# Patient Record
Sex: MALE | Race: ASIAN | Employment: FULL TIME | URBAN - METROPOLITAN AREA
[De-identification: names, ages, dates, MRNs, and addresses within clinical notes are randomized per-mention and may not be internally consistent; named-entity substitution may affect disease eponyms.]

---

## 2024-08-24 ENCOUNTER — HOSPITAL ENCOUNTER (INPATIENT)
Age: 44
LOS: 1 days | Discharge: HOME | DRG: 158 | End: 2024-08-25
Attending: EMERGENCY MEDICINE | Admitting: INTERNAL MEDICINE
Payer: COMMERCIAL

## 2024-08-24 ENCOUNTER — APPOINTMENT (OUTPATIENT)
Dept: CT IMAGING | Age: 44
DRG: 158 | End: 2024-08-24
Payer: COMMERCIAL

## 2024-08-24 DIAGNOSIS — K14.0 GLOSSITIS: Primary | ICD-10-CM

## 2024-08-24 DIAGNOSIS — K14.0 ABSCESS OF TONGUE: ICD-10-CM

## 2024-08-24 PROBLEM — R22.0 TONGUE SWELLING: Status: ACTIVE | Noted: 2024-08-24

## 2024-08-24 LAB
ALBUMIN SERPL-MCNC: 4.3 G/DL (ref 3.5–5.2)
ALBUMIN/GLOB SERPL: 2 {RATIO} (ref 1–2.5)
ALP SERPL-CCNC: 63 U/L (ref 40–129)
ALT SERPL-CCNC: 34 U/L (ref 10–50)
ANION GAP SERPL CALCULATED.3IONS-SCNC: 12 MMOL/L (ref 9–16)
AST SERPL-CCNC: 28 U/L (ref 10–50)
ATYPICAL LYMPHOCYTE ABSOLUTE COUNT: 0.05 K/UL
ATYPICAL LYMPHOCYTES: 1 %
BACTERIA URNS QL MICRO: NORMAL
BASOPHILS # BLD: 0.05 K/UL (ref 0–0.2)
BASOPHILS NFR BLD: 1 % (ref 0–2)
BILIRUB DIRECT SERPL-MCNC: 0.1 MG/DL (ref 0–0.2)
BILIRUB INDIRECT SERPL-MCNC: 0.1 MG/DL (ref 0–1)
BILIRUB SERPL-MCNC: 0.2 MG/DL (ref 0–1.2)
BILIRUB UR QL STRIP: NEGATIVE
BUN SERPL-MCNC: 12 MG/DL (ref 6–20)
C3 SERPL-MCNC: 173 MG/DL (ref 90–180)
C4 SERPL-MCNC: 28 MG/DL (ref 10–40)
CALCIUM SERPL-MCNC: 9.1 MG/DL (ref 8.6–10.4)
CALCIUM UR-MCNC: 0.9 MG/DL
CASTS #/AREA URNS LPF: NORMAL /LPF (ref 0–8)
CHLORIDE SERPL-SCNC: 99 MMOL/L (ref 98–107)
CHLORIDE UR-SCNC: 40 MMOL/L
CLARITY UR: CLEAR
CO2 SERPL-SCNC: 23 MMOL/L (ref 20–31)
COLOR UR: YELLOW
COMMENT: ABNORMAL
CREAT SERPL-MCNC: 1.3 MG/DL (ref 0.7–1.2)
CREAT UR-MCNC: 49 MG/DL (ref 39–259)
EOSINOPHIL # BLD: 0 K/UL (ref 0–0.4)
EOSINOPHILS RELATIVE PERCENT: 0 % (ref 1–4)
EPI CELLS #/AREA URNS HPF: NORMAL /HPF (ref 0–5)
ERYTHROCYTE [DISTWIDTH] IN BLOOD BY AUTOMATED COUNT: 12.2 % (ref 11.8–14.4)
FOLATE SERPL-MCNC: 14.8 NG/ML (ref 4.8–24.2)
FREE KAPPA/LAMBDA RATIO: 1.25 (ref 0.22–1.74)
GFR, ESTIMATED: 69 ML/MIN/1.73M2
GLOBULIN SER CALC-MCNC: 2.4 G/DL
GLUCOSE BLD-MCNC: 131 MG/DL (ref 75–110)
GLUCOSE SERPL-MCNC: 103 MG/DL (ref 74–99)
GLUCOSE UR STRIP-MCNC: NEGATIVE MG/DL
HCT VFR BLD AUTO: 48.6 % (ref 40.7–50.3)
HGB BLD-MCNC: 16.7 G/DL (ref 13–17)
HGB UR QL STRIP.AUTO: NEGATIVE
IMM GRANULOCYTES # BLD AUTO: 0.05 K/UL (ref 0–0.3)
IMM GRANULOCYTES NFR BLD: 1 %
KAPPA LC FREE SER-MCNC: 16.7 MG/L
KETONES UR STRIP-MCNC: ABNORMAL MG/DL
LACTIC ACID, WHOLE BLOOD: 1.4 MMOL/L (ref 0.7–2.1)
LAMBDA LC FREE SERPL-MCNC: 13.4 MG/L (ref 4.2–27.7)
LEUKOCYTE ESTERASE UR QL STRIP: NEGATIVE
LYMPHOCYTES NFR BLD: 0.98 K/UL (ref 1–4.8)
LYMPHOCYTES RELATIVE PERCENT: 20 % (ref 24–44)
MCH RBC QN AUTO: 31.5 PG (ref 25.2–33.5)
MCHC RBC AUTO-ENTMCNC: 34.4 G/DL (ref 28.4–34.8)
MCV RBC AUTO: 91.5 FL (ref 82.6–102.9)
MONOCYTES NFR BLD: 0.49 K/UL (ref 0.1–0.8)
MONOCYTES NFR BLD: 10 % (ref 1–7)
MORPHOLOGY: NORMAL
NEUTROPHILS NFR BLD: 67 % (ref 36–66)
NEUTS SEG NFR BLD: 3.28 K/UL (ref 1.8–7.7)
NITRITE UR QL STRIP: NEGATIVE
NRBC BLD-RTO: 0 PER 100 WBC
OSMOLALITY SERPL: 291 MOSM/KG (ref 275–295)
OSMOLALITY UR: 651 MOSM/KG (ref 80–1300)
PH UR STRIP: 6 [PH] (ref 5–8)
PLATELET # BLD AUTO: 165 K/UL (ref 138–453)
PMV BLD AUTO: 9.6 FL (ref 8.1–13.5)
POTASSIUM SERPL-SCNC: 4 MMOL/L (ref 3.7–5.3)
PROT SERPL-MCNC: 6.7 G/DL (ref 6.6–8.7)
PROT UR STRIP-MCNC: NEGATIVE MG/DL
RBC # BLD AUTO: 5.31 M/UL (ref 4.21–5.77)
RBC #/AREA URNS HPF: NORMAL /HPF (ref 0–4)
SODIUM SERPL-SCNC: 134 MMOL/L (ref 136–145)
SODIUM UR-SCNC: 35 MMOL/L
SP GR UR STRIP: 1.04 (ref 1–1.03)
TOTAL PROTEIN, URINE: 7 MG/DL
URINE TOTAL PROTEIN CREATININE RATIO: 0.14
UROBILINOGEN UR STRIP-ACNC: NORMAL EU/DL (ref 0–1)
VIT B12 SERPL-MCNC: 376 PG/ML (ref 232–1245)
WBC #/AREA URNS HPF: NORMAL /HPF (ref 0–5)
WBC OTHER # BLD: 4.9 K/UL (ref 3.5–11.3)

## 2024-08-24 PROCEDURE — 2500000003 HC RX 250 WO HCPCS: Performed by: EMERGENCY MEDICINE

## 2024-08-24 PROCEDURE — 70491 CT SOFT TISSUE NECK W/DYE: CPT

## 2024-08-24 PROCEDURE — 51798 US URINE CAPACITY MEASURE: CPT

## 2024-08-24 PROCEDURE — 2580000003 HC RX 258: Performed by: EMERGENCY MEDICINE

## 2024-08-24 PROCEDURE — 83605 ASSAY OF LACTIC ACID: CPT

## 2024-08-24 PROCEDURE — 2580000003 HC RX 258

## 2024-08-24 PROCEDURE — 81015 MICROSCOPIC EXAM OF URINE: CPT

## 2024-08-24 PROCEDURE — 86225 DNA ANTIBODY NATIVE: CPT

## 2024-08-24 PROCEDURE — 84166 PROTEIN E-PHORESIS/URINE/CSF: CPT

## 2024-08-24 PROCEDURE — 84300 ASSAY OF URINE SODIUM: CPT

## 2024-08-24 PROCEDURE — 36415 COLL VENOUS BLD VENIPUNCTURE: CPT

## 2024-08-24 PROCEDURE — 82340 ASSAY OF CALCIUM IN URINE: CPT

## 2024-08-24 PROCEDURE — 1200000000 HC SEMI PRIVATE

## 2024-08-24 PROCEDURE — 99223 1ST HOSP IP/OBS HIGH 75: CPT | Performed by: INTERNAL MEDICINE

## 2024-08-24 PROCEDURE — 6360000004 HC RX CONTRAST MEDICATION: Performed by: EMERGENCY MEDICINE

## 2024-08-24 PROCEDURE — 82570 ASSAY OF URINE CREATININE: CPT

## 2024-08-24 PROCEDURE — 96367 TX/PROPH/DG ADDL SEQ IV INF: CPT

## 2024-08-24 PROCEDURE — 81003 URINALYSIS AUTO W/O SCOPE: CPT

## 2024-08-24 PROCEDURE — 86038 ANTINUCLEAR ANTIBODIES: CPT

## 2024-08-24 PROCEDURE — 86160 COMPLEMENT ANTIGEN: CPT

## 2024-08-24 PROCEDURE — 83935 ASSAY OF URINE OSMOLALITY: CPT

## 2024-08-24 PROCEDURE — 82607 VITAMIN B-12: CPT

## 2024-08-24 PROCEDURE — 85025 COMPLETE CBC W/AUTO DIFF WBC: CPT

## 2024-08-24 PROCEDURE — 82436 ASSAY OF URINE CHLORIDE: CPT

## 2024-08-24 PROCEDURE — 84155 ASSAY OF PROTEIN SERUM: CPT

## 2024-08-24 PROCEDURE — 99285 EMERGENCY DEPT VISIT HI MDM: CPT

## 2024-08-24 PROCEDURE — 6360000002 HC RX W HCPCS

## 2024-08-24 PROCEDURE — 80048 BASIC METABOLIC PNL TOTAL CA: CPT

## 2024-08-24 PROCEDURE — 80074 ACUTE HEPATITIS PANEL: CPT

## 2024-08-24 PROCEDURE — 87040 BLOOD CULTURE FOR BACTERIA: CPT

## 2024-08-24 PROCEDURE — 96365 THER/PROPH/DIAG IV INF INIT: CPT

## 2024-08-24 PROCEDURE — 99221 1ST HOSP IP/OBS SF/LOW 40: CPT | Performed by: OTOLARYNGOLOGY

## 2024-08-24 PROCEDURE — 82746 ASSAY OF FOLIC ACID SERUM: CPT

## 2024-08-24 PROCEDURE — 84156 ASSAY OF PROTEIN URINE: CPT

## 2024-08-24 PROCEDURE — 6370000000 HC RX 637 (ALT 250 FOR IP)

## 2024-08-24 PROCEDURE — 80076 HEPATIC FUNCTION PANEL: CPT

## 2024-08-24 PROCEDURE — 82947 ASSAY GLUCOSE BLOOD QUANT: CPT

## 2024-08-24 PROCEDURE — 96375 TX/PRO/DX INJ NEW DRUG ADDON: CPT

## 2024-08-24 PROCEDURE — 83930 ASSAY OF BLOOD OSMOLALITY: CPT

## 2024-08-24 PROCEDURE — 6360000002 HC RX W HCPCS: Performed by: EMERGENCY MEDICINE

## 2024-08-24 PROCEDURE — 84165 PROTEIN E-PHORESIS SERUM: CPT

## 2024-08-24 PROCEDURE — 83521 IG LIGHT CHAINS FREE EACH: CPT

## 2024-08-24 RX ORDER — ONDANSETRON 4 MG/1
4 TABLET, ORALLY DISINTEGRATING ORAL EVERY 8 HOURS PRN
Status: DISCONTINUED | OUTPATIENT
Start: 2024-08-24 | End: 2024-08-25 | Stop reason: HOSPADM

## 2024-08-24 RX ORDER — MAGNESIUM SULFATE IN WATER 40 MG/ML
2000 INJECTION, SOLUTION INTRAVENOUS PRN
Status: DISCONTINUED | OUTPATIENT
Start: 2024-08-24 | End: 2024-08-25 | Stop reason: HOSPADM

## 2024-08-24 RX ORDER — POTASSIUM CHLORIDE 7.45 MG/ML
10 INJECTION INTRAVENOUS PRN
Status: DISCONTINUED | OUTPATIENT
Start: 2024-08-24 | End: 2024-08-25 | Stop reason: HOSPADM

## 2024-08-24 RX ORDER — POTASSIUM CHLORIDE 1500 MG/1
40 TABLET, EXTENDED RELEASE ORAL PRN
Status: DISCONTINUED | OUTPATIENT
Start: 2024-08-24 | End: 2024-08-25 | Stop reason: HOSPADM

## 2024-08-24 RX ORDER — SODIUM CHLORIDE 0.9 % (FLUSH) 0.9 %
5-40 SYRINGE (ML) INJECTION PRN
Status: DISCONTINUED | OUTPATIENT
Start: 2024-08-24 | End: 2024-08-25 | Stop reason: HOSPADM

## 2024-08-24 RX ORDER — ONDANSETRON 2 MG/ML
4 INJECTION INTRAMUSCULAR; INTRAVENOUS EVERY 6 HOURS PRN
Status: DISCONTINUED | OUTPATIENT
Start: 2024-08-24 | End: 2024-08-25 | Stop reason: HOSPADM

## 2024-08-24 RX ORDER — IOPAMIDOL 755 MG/ML
75 INJECTION, SOLUTION INTRAVASCULAR
Status: COMPLETED | OUTPATIENT
Start: 2024-08-24 | End: 2024-08-24

## 2024-08-24 RX ORDER — TRAMADOL HYDROCHLORIDE 50 MG/1
25 TABLET ORAL EVERY 6 HOURS PRN
Status: DISCONTINUED | OUTPATIENT
Start: 2024-08-24 | End: 2024-08-25 | Stop reason: HOSPADM

## 2024-08-24 RX ORDER — SODIUM CHLORIDE 9 MG/ML
INJECTION, SOLUTION INTRAVENOUS PRN
Status: DISCONTINUED | OUTPATIENT
Start: 2024-08-24 | End: 2024-08-25 | Stop reason: HOSPADM

## 2024-08-24 RX ORDER — DEXAMETHASONE SODIUM PHOSPHATE 4 MG/ML
4 INJECTION, SOLUTION INTRA-ARTICULAR; INTRALESIONAL; INTRAMUSCULAR; INTRAVENOUS; SOFT TISSUE ONCE
Status: COMPLETED | OUTPATIENT
Start: 2024-08-24 | End: 2024-08-24

## 2024-08-24 RX ORDER — ACETAMINOPHEN 650 MG/1
650 SUPPOSITORY RECTAL EVERY 6 HOURS PRN
Status: DISCONTINUED | OUTPATIENT
Start: 2024-08-24 | End: 2024-08-25 | Stop reason: HOSPADM

## 2024-08-24 RX ORDER — MORPHINE SULFATE 4 MG/ML
2 INJECTION INTRAVENOUS ONCE
Status: COMPLETED | OUTPATIENT
Start: 2024-08-24 | End: 2024-08-24

## 2024-08-24 RX ORDER — POLYETHYLENE GLYCOL 3350 17 G/17G
17 POWDER, FOR SOLUTION ORAL DAILY PRN
Status: DISCONTINUED | OUTPATIENT
Start: 2024-08-24 | End: 2024-08-25 | Stop reason: HOSPADM

## 2024-08-24 RX ORDER — 0.9 % SODIUM CHLORIDE 0.9 %
1000 INTRAVENOUS SOLUTION INTRAVENOUS ONCE
Status: COMPLETED | OUTPATIENT
Start: 2024-08-24 | End: 2024-08-24

## 2024-08-24 RX ORDER — SODIUM CHLORIDE 0.9 % (FLUSH) 0.9 %
5-40 SYRINGE (ML) INJECTION EVERY 12 HOURS SCHEDULED
Status: DISCONTINUED | OUTPATIENT
Start: 2024-08-24 | End: 2024-08-25 | Stop reason: HOSPADM

## 2024-08-24 RX ORDER — ACETAMINOPHEN 325 MG/1
650 TABLET ORAL EVERY 6 HOURS PRN
Status: DISCONTINUED | OUTPATIENT
Start: 2024-08-24 | End: 2024-08-25 | Stop reason: HOSPADM

## 2024-08-24 RX ORDER — ENOXAPARIN SODIUM 100 MG/ML
40 INJECTION SUBCUTANEOUS DAILY
Status: DISCONTINUED | OUTPATIENT
Start: 2024-08-24 | End: 2024-08-25 | Stop reason: HOSPADM

## 2024-08-24 RX ADMIN — MORPHINE SULFATE 2 MG: 4 INJECTION INTRAVENOUS at 08:43

## 2024-08-24 RX ADMIN — DOXYCYCLINE 100 MG: 100 INJECTION, POWDER, LYOPHILIZED, FOR SOLUTION INTRAVENOUS at 20:36

## 2024-08-24 RX ADMIN — ALUMINUM HYDROXIDE, MAGNESIUM HYDROXIDE, AND SIMETHICONE 5 ML: 1200; 120; 1200 SUSPENSION ORAL at 13:46

## 2024-08-24 RX ADMIN — ENOXAPARIN SODIUM 40 MG: 100 INJECTION SUBCUTANEOUS at 13:16

## 2024-08-24 RX ADMIN — SODIUM CHLORIDE 1000 ML: 9 INJECTION, SOLUTION INTRAVENOUS at 08:59

## 2024-08-24 RX ADMIN — CEFTRIAXONE SODIUM 1000 MG: 1 INJECTION, POWDER, FOR SOLUTION INTRAMUSCULAR; INTRAVENOUS at 08:58

## 2024-08-24 RX ADMIN — WATER 40 MG: 1 INJECTION INTRAMUSCULAR; INTRAVENOUS; SUBCUTANEOUS at 13:16

## 2024-08-24 RX ADMIN — DEXAMETHASONE SODIUM PHOSPHATE 4 MG: 4 INJECTION INTRA-ARTICULAR; INTRALESIONAL; INTRAMUSCULAR; INTRAVENOUS; SOFT TISSUE at 08:44

## 2024-08-24 RX ADMIN — TRAMADOL HYDROCHLORIDE 25 MG: 50 TABLET ORAL at 18:30

## 2024-08-24 RX ADMIN — DOXYCYCLINE 100 MG: 100 INJECTION, POWDER, LYOPHILIZED, FOR SOLUTION INTRAVENOUS at 09:34

## 2024-08-24 RX ADMIN — ALUMINUM HYDROXIDE, MAGNESIUM HYDROXIDE, AND SIMETHICONE 5 ML: 1200; 120; 1200 SUSPENSION ORAL at 20:22

## 2024-08-24 RX ADMIN — IOPAMIDOL 75 ML: 755 INJECTION, SOLUTION INTRAVENOUS at 08:50

## 2024-08-24 ASSESSMENT — PAIN DESCRIPTION - LOCATION
LOCATION: MOUTH

## 2024-08-24 ASSESSMENT — PAIN DESCRIPTION - DESCRIPTORS: DESCRIPTORS: ACHING;STABBING

## 2024-08-24 ASSESSMENT — PAIN SCALES - GENERAL
PAINLEVEL_OUTOF10: 6
PAINLEVEL_OUTOF10: 0
PAINLEVEL_OUTOF10: 0
PAINLEVEL_OUTOF10: 8
PAINLEVEL_OUTOF10: 0
PAINLEVEL_OUTOF10: 5

## 2024-08-24 ASSESSMENT — PAIN DESCRIPTION - ORIENTATION: ORIENTATION: LEFT

## 2024-08-24 ASSESSMENT — PAIN - FUNCTIONAL ASSESSMENT: PAIN_FUNCTIONAL_ASSESSMENT: ACTIVITIES ARE NOT PREVENTED

## 2024-08-24 NOTE — ED PROVIDER NOTES
Drug use: Not on file    Sexual activity: Not on file   Other Topics Concern    Not on file   Social History Narrative    Not on file     Social Determinants of Health     Financial Resource Strain: Not on file   Food Insecurity: No Food Insecurity (3/9/2024)    Received from [facility]    Hunger Screening     Within the past 12 months we worried whether our food would run out before we got money to buy more.: Never True     Within the past 12 months the food we bought just didn't last and we didn't have money to get more.: Never True   Transportation Needs: Not on file   Physical Activity: Not on file   Stress: Not on file   Social Connections: Not on file   Intimate Partner Violence: Not on file   Housing Stability: Not on file       No family history on file.    Allergies:  Shellfish allergy    Home Medications:  Prior to Admission medications    Not on File         REVIEW OF SYSTEMS       Review of Systems   Constitutional:  Positive for chills and fever.   HENT:  Negative for drooling, sore throat and trouble swallowing.    Respiratory:  Negative for shortness of breath.    Gastrointestinal:  Negative for nausea and vomiting.   Skin:  Positive for wound.       PHYSICAL EXAM      INITIAL VITALS:   BP (!) 139/98   Pulse 90   Temp 99 °F (37.2 °C)   Resp 20   SpO2 99%     Physical Exam  Constitutional:       General: He is not in acute distress.  HENT:      Head: Normocephalic and atraumatic.      Mouth/Throat:        Comments: No submandibular swelling  Eyes:      General: No scleral icterus.  Cardiovascular:      Rate and Rhythm: Normal rate and regular rhythm.      Heart sounds: Normal heart sounds.   Pulmonary:      Effort: Pulmonary effort is normal.      Breath sounds: Normal breath sounds.   Abdominal:      General: There is no distension.      Palpations: Abdomen is soft.      Tenderness: There is no abdominal tenderness. There is no guarding.   Musculoskeletal:      Cervical back: No        Carolina Valentine, DO  Emergency Medicine Resident    (Please note that portions of this note were completed with a voice recognition program.  Efforts were made to edit the dictations but occasionally words are mis-transcribed.)

## 2024-08-24 NOTE — PLAN OF CARE
44-year-old male with no significant past medical history came with complaint of tongue swelling which has been going on for past week.  Per patient he accidentally bit his tongue on the left lateral side while eating dinner, noticed ulcer after 2 days.  Additionally patient gives history of eating raw oysters after which started having tongue swelling associated with chills.  He denies any discharge from the ulcer.  He denies similar reaction in the past, shortness of breath, skin rash, wheezes, breathing difficulties or any allergic reactions that he knows of.  Patient received 2 days of amoxicillin antibiotics from the urgent care and [facility], since there is no much improvement he came to the ER for further evaluation and admitted under the internal medicine for monitoring respiratory status    In the ER CT soft tissue neck with contrast is unremarkable except for mild bilateral prominence of the palatine tonsils with level 2, level 5 lymph node prominence likely reactive.  ENT evaluated the patient, recommends conservative management.  BMP is remarkable for elevated creatinine 1.3 GFR above 60, other labs are otherwise unremarkable    On my evaluation patient is alert, awake, oriented, AOx4, afebrile, hemodynamically stable, saturating appropriately on room air, on auscultation no wheezes heard or stridor appreciated.    Assessment and plan:  Ulceration and swelling of left lateral tongue:  Secondary to trauma versus angioedema  Follow-up on C1 inhibitor assay  ENT is on board appreciate the recommendations  Started on soft diet, will continue to monitor respiratory status  Follow-up on blood cultures  Started on doxycycline 100 mg twice daily    Nonoliguric EVERETTE  Likely secondary to dehydration due to decreased p.o. intake  Patient's baseline creatinine is not available but on arrival patient's creatinine is 1.3  Follow-up on EVERETTE workup urine studies, serum light chain, serum protein electrophoresis, C3-C4

## 2024-08-24 NOTE — ED TRIAGE NOTES
Pt to ed c/o tongue pain, swelling. Per pt he bit his tongue about a week ago and noticed a cut. Pt states ulcer has worsened. Pt states he was eating raw oysters a few days ago and that seemed to make it worse. Ulcer is on the left side of tongue. Pt ws seen at urgent care a few days ago and given amoxicillin, but pt states it is not helping. Pt has been taking tylenol with no relief. Airway intact, no sob.     Pt is alert and oriented x4. Ambulatory to room. Pt in gown. Vitals stable. Call light in reach. Will continue with plan of care.

## 2024-08-24 NOTE — CONSULTS
CONSULTING SERVICE: Otolaryngology-Head and Neck Surgery    Informant:   The history was obtained from the patient.    Chief Complaint:   His chief complaint is tongue swelling, infection    History of Present Illness:   Mendoza Egan is a 44 y.o. male seen consultation at the request of ED on 8/24/2024.      45yo M with L anterior tongue ulceration 2/2 cut about 1-2 weeks ago.  Ate raw oysters and tongue has swollen quite a bit since then.  He saw UC 8/22 who recommended good oral hygiene and salt water rinses.  He was also started on augmentin, but has had progression of symptoms.      Other Pertinent ENT-specific HPI:  None    Pertinent Social/Birth/Family/Medical/Surgical History   None    Examination:   Vital Signs   Vitals:    08/24/24 0833 08/24/24 0834 08/24/24 0835 08/24/24 0836   BP:       Pulse:       Resp:       Temp:       SpO2: 100% 99% 100% 99%       Constitutional   General Appearance: well developed and well nourished and in no acute distress  Speech: age appropriate    Head & Face   Head:   normocephalic and symmetric  Eyes: no eyelid swelling, no conjunctival injection or exudate, pupils equal round and reactive to light    Ears   Right EXT: normal  Right EAC: patent  Right TM: normal landmarks and mobility    Left EXT: normal  Left EAC: patent  Left TM: normal landmarks and mobility    Hearing: is responsive to whispered voice.       Nose   Dorsum: dorsum midline  Nasal mucosa: no edema.    Rhinorrhea: no drainage  Septum: midline. No perforation  Turbinates: no inferior turbinate hypertrophy  Nasopharynx Unable to perform indirect mirror laryngoscopy due to patient age and intolerance of exam    Oral Cavity, Oropharynx   Lips: normal  Dentition: dentition grossly normal   Oral mucosa: moist, pink  Gums: normal  Palate: intact, mobile  Pharynx: intact mobile  Posterior pharyngeal wall: normal  Tongue: Diffuse edema of tongue, L anterior ulceration with fibrinous base, tender, indurated anteriorly,

## 2024-08-24 NOTE — H&P
[facility]     Department of Internal Medicine - Staff Internal Medicine Teaching Service          ADMISSION NOTE/HISTORY AND PHYSICAL EXAMINATION   Date: 8/24/2024  Patient Name: Mendoza Egan  Date of admission: 8/24/2024  7:58 AM  YOB: 1980  PCP: No primary care provider on file.  History Obtained From:  patient, electronic medical record    CHIEF COMPLAINT     Chief complaint: Tongue swelling for 1 week.    HISTORY OF PRESENTING ILLNESS     The patient is a pleasant and healthy 44 y.o. male presents with a chief complaint of tongue swelling for 1 week.  According to the patient he was in usual state of health then 2 weeks back he developed tongue ulcer at the left lateral side secondary to accidental tongue bite while eating dinner.  After 2 days he started seeing tongue ulcer that was painful but does not complain of any discharge.  Then on Tuesday he ate raw oysters in the inner after few hours he started complaining of ulcer become more painful, progressing to tongue swelling and feeling of subjective fevers with chills.  He also complained of painful lymph node at the left lateral neck that become smaller over the course of days.  He denied for nausea, vomiting, diarrhea, breathing difficulties with wheezes and shortness of breath.  He denied for rash or skin changes, dizziness, chest pain and palpitations.      Next day he visited urgent care in [facility] he was evaluated and was given amoxicillin antibiotics.  He took antibiotics for 2 days without any improvement in his symptoms.    Today in the ED he came with tongue swelling and he was found to have elevated blood pressure with 139/98, positive lymphadenopathy.    On labs he was found to have sodium 134, creatinine 1.3  On imaging with CT soft tissue neck with contrast, mild prominence of bilateral palatine tonsils secondary to inflammatory changes noted and mildly prominent left level 2 and level 5 lymph

## 2024-08-24 NOTE — ED NOTES
ED to inpatient nurses report      Chief Complaint:  Chief Complaint   Patient presents with    Oral Swelling     Present to ED from: home    MOA:     LOC: alert and orientated to name, place, date  Mobility: Independent  Oxygen Baseline: 0    Current needs required: 0   Pending ED orders: n/a  Present condition: stable    Why did the patient come to the ED? Pt to ed c/o tongue pain, swelling. Per pt he bit his tongue about a week ago and noticed a cut. Pt states ulcer has worsened. Pt states he was eating raw oysters a few days ago and that seemed to make it worse. Ulcer is on the left side of tongue. Pt ws seen at urgent care a few days ago and given amoxicillin, but pt states it is not helping. Pt has been taking tylenol with no relief. Airway intact, no sob.   What is the plan? Otolaryngoly consult   Any procedures or intervention occur? Antibiotics   Any safety concerns?? N/A    Mental Status:       Psych Assessment:   Psychosocial  Psychosocial (WDL): Within Defined Limits  Vital signs   Vitals:    08/24/24 0833 08/24/24 0834 08/24/24 0835 08/24/24 0836   BP:       Pulse:       Resp:       Temp:       SpO2: 100% 99% 100% 99%        Vitals:  Patient Vitals for the past 24 hrs:   BP Temp Pulse Resp SpO2   08/24/24 0836 -- -- -- -- 99 %   08/24/24 0835 -- -- -- -- 100 %   08/24/24 0834 -- -- -- -- 99 %   08/24/24 0833 -- -- -- -- 100 %   08/24/24 0832 -- -- -- -- 99 %   08/24/24 0828 -- -- -- -- 100 %   08/24/24 0754 (!) 139/98 99 °F (37.2 °C) 90 20 100 %      Visit Vitals  BP (!) 139/98   Pulse 90   Temp 99 °F (37.2 °C)   Resp 20   SpO2 99%        LDAs:   Peripheral IV 08/24/24 Right Antecubital (Active)   Site Assessment Clean, dry & intact 08/24/24 0818   Line Status Blood return noted 08/24/24 0818   Phlebitis Assessment No symptoms 08/24/24 0818   Infiltration Assessment 0 08/24/24 0818   Dressing Status New dressing applied;Clean, dry & intact 08/24/24 0818   Dressing Type Transparent 08/24/24 0818    Dressing Intervention New 08/24/24 0818       Peripheral IV 08/24/24 Left;Anterior Hand (Active)   Site Assessment Clean, dry & intact 08/24/24 0818   Line Status Blood return noted 08/24/24 0818   Phlebitis Assessment No symptoms 08/24/24 0818   Infiltration Assessment 0 08/24/24 0818   Dressing Status New dressing applied;Clean, dry & intact 08/24/24 0818   Dressing Type Transparent 08/24/24 0818   Dressing Intervention New 08/24/24 0818       Ambulatory Status:  Presents to emergency department  because of falls (Syncope, seizure, or loss of consciousness): No, Age > 70: No, Altered Mental Status, Intoxication with alcohol or substance confusion (Disorientation, impaired judgment, poor safety awaremess, or inability to follow instructions): No, Impaired Mobility: Ambulates or transfers with assistive devices or assistance; Unable to ambulate or transer.: No, Nursing Judgement: No    Diagnosis:  DISPOSITION Admitted 08/24/2024 11:39:49 AM   Final diagnoses:   None        Consults:  IP CONSULT TO OTOLARYNGOLOGY  IP CONSULT TO INTERNAL MEDICINE  IP CONSULT TO CASE MANAGEMENT     Treatment Team:   Treatment Team:   Precious Cleaning MD Gorsuch, Taylor, RN Krise, Stephanie, DO Lindsey, Spencer E, MD Avasthi, Deepti, MD    Treatment:  ED Course as of 08/24/24 1142   Sat Aug 24, 2024   0851 Sodium(!): 134 [SK]   0851 Creatinine(!): 1.3 [SK]   0936 WBC: 4.9 [SK]   0937 Platelet Count: 165 [SK]   0937 Hemoglobin Quant: 16.7 [SK]   0949 Reevaluated patient and he is resting comfortably in bed.  He is asking for food however discussed with him at this time he needs to be n.p.o. until we have a CT scan and ENT consult.  Patient states pain is improved after pain medication. [SK]   1034 CT SOFT TISSUE NECK W CONTRAST  Mild prominence of the bilateral palatine tonsils, likely related to  inflammatory changes.     Mildly prominent left level 2 and level 5 lymph nodes, likely reactive.   [SK]   1043 Discussed with ENT who

## 2024-08-24 NOTE — ED PROVIDER NOTES
[facility]     Emergency Department     Faculty Attestation    I performed a history and physical examination of the patient and discussed management with the resident. I reviewed the resident’s note and agree with the documented findings and plan of care. Any areas of disagreement are noted on the chart. I was personally present for the key portions of any procedures. I have documented in the chart those procedures where I was not present during the key portions. I have reviewed the emergency nurses triage note. I agree with the chief complaint, past medical history, past surgical history, allergies, medications, social and family history as documented unless otherwise noted below.        For Physician Assistant/ Nurse Practitioner cases/documentation I have personally evaluated this patient and have completed at least one if not all key elements of the E/M (history, physical exam, and MDM). Additional findings are as noted.  I have personally seen and evaluated the patient.  I find the patient's history and physical exam are consistent with the NP/PA documentation.  I agree with the care provided, treatment rendered, disposition and follow-up plan.    Patient has had progressive week to 2 weeks of tongue swelling wound which began after biting his tongue and seems to be provoked after eating oysters.  The patient has what appears to be a superficial ulcer noted on the left most anterior portion of the tongue the tongue itself is noted to be markedly swollen consistent with glossitis    Currently the airway is intact      Critical Care     Kieran Sifuentes M.D.  Attending Emergency  Physician           Kieran Sifuentes MD  08/24/24 9032

## 2024-08-24 NOTE — ED NOTES
The following labs were labeled with appropriate pt sticker and tubed to lab:     [x] Blue     [x] Lavender   [] on ice  [x] Green/yellow  [x] Green/black [] on ice  [] Grey  [] on ice  [] Yellow  [] Red  [] Pink  [] Type/ Screen  [] ABG  [] VBG    [] COVID-19 swab    [] Rapid  [] PCR  [] Flu swab  [] Peds Viral Panel     [] Urine Sample  [] Fecal Sample  [] Pelvic Cultures  [x] Blood Cultures  [x] X 2  [] STREP Cultures  [] Wound Cultures

## 2024-08-25 ENCOUNTER — APPOINTMENT (OUTPATIENT)
Dept: ULTRASOUND IMAGING | Age: 44
DRG: 158 | End: 2024-08-25
Payer: COMMERCIAL

## 2024-08-25 VITALS
WEIGHT: 172.84 LBS | DIASTOLIC BLOOD PRESSURE: 89 MMHG | HEART RATE: 64 BPM | RESPIRATION RATE: 18 BRPM | SYSTOLIC BLOOD PRESSURE: 128 MMHG | BODY MASS INDEX: 29.51 KG/M2 | HEIGHT: 64 IN | OXYGEN SATURATION: 97 % | TEMPERATURE: 98.1 F

## 2024-08-25 LAB
ANION GAP SERPL CALCULATED.3IONS-SCNC: 9 MMOL/L (ref 9–16)
BASOPHILS # BLD: 0 K/UL (ref 0–0.2)
BASOPHILS NFR BLD: 0 % (ref 0–2)
BUN SERPL-MCNC: 13 MG/DL (ref 6–20)
CALCIUM SERPL-MCNC: 9 MG/DL (ref 8.6–10.4)
CHLORIDE SERPL-SCNC: 106 MMOL/L (ref 98–107)
CO2 SERPL-SCNC: 23 MMOL/L (ref 20–31)
CREAT SERPL-MCNC: 1 MG/DL (ref 0.7–1.2)
EOSINOPHIL # BLD: 0 K/UL (ref 0–0.4)
EOSINOPHILS RELATIVE PERCENT: 0 % (ref 1–4)
ERYTHROCYTE [DISTWIDTH] IN BLOOD BY AUTOMATED COUNT: 11.9 % (ref 11.8–14.4)
GFR, ESTIMATED: >90 ML/MIN/1.73M2
GLUCOSE BLD-MCNC: 178 MG/DL (ref 75–110)
GLUCOSE SERPL-MCNC: 173 MG/DL (ref 74–99)
HCT VFR BLD AUTO: 45.7 % (ref 40.7–50.3)
HGB BLD-MCNC: 15.7 G/DL (ref 13–17)
IMM GRANULOCYTES # BLD AUTO: 0 K/UL (ref 0–0.3)
IMM GRANULOCYTES NFR BLD: 0 %
LYMPHOCYTES NFR BLD: 2.38 K/UL (ref 1–4.8)
LYMPHOCYTES RELATIVE PERCENT: 41 % (ref 24–44)
MCH RBC QN AUTO: 30.9 PG (ref 25.2–33.5)
MCHC RBC AUTO-ENTMCNC: 34.4 G/DL (ref 28.4–34.8)
MCV RBC AUTO: 90 FL (ref 82.6–102.9)
MONOCYTES NFR BLD: 0.58 K/UL (ref 0.1–0.8)
MONOCYTES NFR BLD: 10 % (ref 1–7)
MORPHOLOGY: NORMAL
NEUTROPHILS NFR BLD: 49 % (ref 36–66)
NEUTS SEG NFR BLD: 2.84 K/UL (ref 1.8–7.7)
NRBC BLD-RTO: 0 PER 100 WBC
PLATELET # BLD AUTO: 184 K/UL (ref 138–453)
PMV BLD AUTO: 9.5 FL (ref 8.1–13.5)
POTASSIUM SERPL-SCNC: 4.1 MMOL/L (ref 3.7–5.3)
RBC # BLD AUTO: 5.08 M/UL (ref 4.21–5.77)
SODIUM SERPL-SCNC: 138 MMOL/L (ref 136–145)
WBC OTHER # BLD: 5.8 K/UL (ref 3.5–11.3)

## 2024-08-25 PROCEDURE — 2580000003 HC RX 258

## 2024-08-25 PROCEDURE — 36415 COLL VENOUS BLD VENIPUNCTURE: CPT

## 2024-08-25 PROCEDURE — 76770 US EXAM ABDO BACK WALL COMP: CPT

## 2024-08-25 PROCEDURE — 2500000003 HC RX 250 WO HCPCS

## 2024-08-25 PROCEDURE — 2500000003 HC RX 250 WO HCPCS: Performed by: EMERGENCY MEDICINE

## 2024-08-25 PROCEDURE — 80048 BASIC METABOLIC PNL TOTAL CA: CPT

## 2024-08-25 PROCEDURE — 6360000002 HC RX W HCPCS

## 2024-08-25 PROCEDURE — 6370000000 HC RX 637 (ALT 250 FOR IP)

## 2024-08-25 PROCEDURE — 93005 ELECTROCARDIOGRAM TRACING: CPT

## 2024-08-25 PROCEDURE — 99233 SBSQ HOSP IP/OBS HIGH 50: CPT | Performed by: INTERNAL MEDICINE

## 2024-08-25 PROCEDURE — 94761 N-INVAS EAR/PLS OXIMETRY MLT: CPT

## 2024-08-25 PROCEDURE — 85025 COMPLETE CBC W/AUTO DIFF WBC: CPT

## 2024-08-25 PROCEDURE — 2580000003 HC RX 258: Performed by: EMERGENCY MEDICINE

## 2024-08-25 RX ORDER — DOXYCYCLINE HYCLATE 100 MG
100 TABLET ORAL 2 TIMES DAILY
Qty: 14 TABLET | Refills: 0 | Status: SHIPPED | OUTPATIENT
Start: 2024-08-25 | End: 2024-09-01

## 2024-08-25 RX ORDER — PREDNISONE 20 MG/1
40 TABLET ORAL DAILY
Qty: 10 TABLET | Refills: 0 | Status: SHIPPED | OUTPATIENT
Start: 2024-08-25 | End: 2024-08-30

## 2024-08-25 RX ADMIN — TRAMADOL HYDROCHLORIDE 25 MG: 50 TABLET ORAL at 09:04

## 2024-08-25 RX ADMIN — WATER 40 MG: 1 INJECTION INTRAMUSCULAR; INTRAVENOUS; SUBCUTANEOUS at 00:33

## 2024-08-25 RX ADMIN — TRAMADOL HYDROCHLORIDE 25 MG: 50 TABLET ORAL at 00:32

## 2024-08-25 RX ADMIN — ENOXAPARIN SODIUM 40 MG: 100 INJECTION SUBCUTANEOUS at 09:03

## 2024-08-25 RX ADMIN — ALUMINUM HYDROXIDE, MAGNESIUM HYDROXIDE, AND SIMETHICONE 5 ML: 1200; 120; 1200 SUSPENSION ORAL at 13:07

## 2024-08-25 RX ADMIN — ALUMINUM HYDROXIDE, MAGNESIUM HYDROXIDE, AND SIMETHICONE 5 ML: 1200; 120; 1200 SUSPENSION ORAL at 09:05

## 2024-08-25 RX ADMIN — WATER 40 MG: 1 INJECTION INTRAMUSCULAR; INTRAVENOUS; SUBCUTANEOUS at 13:07

## 2024-08-25 RX ADMIN — SODIUM CHLORIDE, PRESERVATIVE FREE 10 ML: 5 INJECTION INTRAVENOUS at 00:35

## 2024-08-25 RX ADMIN — DOXYCYCLINE 100 MG: 100 INJECTION, POWDER, LYOPHILIZED, FOR SOLUTION INTRAVENOUS at 09:00

## 2024-08-25 RX ADMIN — SODIUM CHLORIDE, PRESERVATIVE FREE 10 ML: 5 INJECTION INTRAVENOUS at 09:06

## 2024-08-25 ASSESSMENT — PAIN DESCRIPTION - ORIENTATION: ORIENTATION: LEFT

## 2024-08-25 ASSESSMENT — PAIN SCALES - WONG BAKER: WONGBAKER_NUMERICALRESPONSE: NO HURT

## 2024-08-25 ASSESSMENT — PAIN - FUNCTIONAL ASSESSMENT: PAIN_FUNCTIONAL_ASSESSMENT: ACTIVITIES ARE NOT PREVENTED

## 2024-08-25 ASSESSMENT — PAIN SCALES - GENERAL
PAINLEVEL_OUTOF10: 5
PAINLEVEL_OUTOF10: 2
PAINLEVEL_OUTOF10: 0

## 2024-08-25 ASSESSMENT — PAIN DESCRIPTION - LOCATION: LOCATION: MOUTH

## 2024-08-25 ASSESSMENT — PAIN DESCRIPTION - DESCRIPTORS: DESCRIPTORS: SORE;ACHING;OTHER (COMMENT)

## 2024-08-25 NOTE — CARE COORDINATION
Case Management Assessment  Initial Evaluation    Date/Time of Evaluation: 8/25/2024 8:15AM  Assessment Completed by: Victoria Gonzales RN    If patient is discharged prior to next notation, then this note serves as note for discharge by case management.    Patient Name: Mendoza Egan                   YOB: 1980  Diagnosis: Glossitis [K14.0]  Abscess of tongue [K14.0]  Tongue swelling [R22.0]                   Date / Time: 8/24/2024  7:58 AM    Patient Admission Status: Inpatient   Readmission Risk (Low < 19, Mod (19-27), High > 27): Readmission Risk Score: 4.2    Current PCP: Paul Coleman MD  PCP verified by CM? Yes (Paul Coleman)    Chart Reviewed: Yes      History Provided by: Patient  Patient Orientation: Alert and Oriented, Person, Situation, Place    Patient Cognition: Alert    Hospitalization in the last 30 days (Readmission):  No    If yes, Readmission Assessment in  Navigator will be completed.    Advance Directives:      Code Status: Full Code   Patient's Primary Decision Maker is: Legal Next of Kin      Discharge Planning:    Patient lives with: Spouse/Significant Other Type of Home: House  Primary Care Giver: Self  Patient Support Systems include: Spouse/Significant Other   Current Financial resources:    Current community resources:    Current services prior to admission: None            Current DME:              Type of Home Care services:  None    ADLS  Prior functional level: Independent in ADLs/IADLs  Current functional level: Independent in ADLs/IADLs    PT AM-PAC:   /24  OT AM-PAC:   /24    Family can provide assistance at DC: No  Would you like Case Management to discuss the discharge plan with any other family members/significant others, and if so, who? Yes ( Christopher)  Plans to Return to Present Housing: Yes  Other Identified Issues/Barriers to RETURNING to current housing: none  Potential Assistance needed at discharge: N/A            Potential DME:    Patient expects

## 2024-08-25 NOTE — PROGRESS NOTES
[facility]  Internal Medicine Teaching Residency Program  Inpatient Daily Progress Note  ______________________________________________________________________________    Patient: Mendoza Egan  YOB: 1980   MRN:[id number]    Acct: [id number]     Room: [facility]50505-01  Admit date: 8/24/2024  Today's date: 08/25/24  Number of days in the hospital: 1    SUBJECTIVE   Admitting Diagnosis: Tongue swelling  CC: Painful tongue ulcer and swelling  Pt examined at bedside. Chart & results reviewed.     No acute overnight events  Patient is hemodynamically stable  Patient is maintaining oxygen saturation at room air    ENT is on board, no acute intervention recommended.  Continue IV antibiotics with doxycycline and continue methylprednisolone.    ROS:  Constitutional:  negative for chills, fevers, sweats  Respiratory:  negative for cough, dyspnea on exertion, hemoptysis, shortness of breath, wheezing  Cardiovascular:  negative for chest pain, chest pressure/discomfort, lower extremity edema, palpitations  Gastrointestinal:  negative for abdominal pain, constipation, diarrhea, nausea, vomiting  Neurological:  negative for dizziness, headache  BRIEF HISTORY     The patient is a pleasant and healthy 44 y.o. male presents with a chief complaint of tongue swelling for 1 week.  According to the patient he was in usual state of health then 2 weeks back he developed tongue ulcer at the left lateral side secondary to accidental tongue bite while eating dinner.  After 2 days he started seeing tongue ulcer that was painful but does not complain of any discharge.  Then on Tuesday he ate raw oysters in the inner after few hours he started complaining of ulcer become more painful, progressing to tongue swelling and feeling of subjective fevers with chills.  He also complained of painful lymph node at the left lateral neck that become smaller over the course of days.  He denied for nausea,  vomiting, diarrhea, breathing difficulties with wheezes and shortness of breath.  He denied for rash or skin changes, dizziness, chest pain and palpitations.       Next day he visited urgent care in [facility] he was evaluated and was given amoxicillin antibiotics.  He took antibiotics for 2 days without any improvement in his symptoms.     Today in the ED he came with tongue swelling and he was found to have elevated blood pressure with 139/98, positive lymphadenopathy.    On labs he was found to have sodium 134, creatinine 1.3  On imaging with CT soft tissue neck with contrast, mild prominence of bilateral palatine tonsils secondary to inflammatory changes noted and mildly prominent left level 2 and level 5 lymph nodes reactive.  ENT was consulted and recommended antibiotic coverage with no acute intervention.  Patient was treated with ceftriaxone 1 g, doxycycline 100 mg, dexamethasone 4 Mg, methylprednisolone 40 Mg.  Morphine 2 Mg, tramadol 25 Mg.     On my evaluation, patient was lying comfortably on bed without any acute distress.  Patient was admitted for concern of glossitis and tongue abscess.    OBJECTIVE     Vital Signs:  /84   Pulse 75   Temp 98.6 °F (37 °C) (Oral)   Resp 17   Ht 1.626 m (5' 4\")   Wt 78.4 kg (172 lb 13.5 oz)   SpO2 97%   BMI 29.67 kg/m²     Temp (24hrs), Av.9 °F (37.2 °C), Min:97.5 °F (36.4 °C), Max:100.4 °F (38 °C)    In: 845   Out: 4000 [Urine:4000]    Physical Exam:  Constitutional: This is a well developed, well nourished, 25-29.9 - Overweight 44 y.o. year old male who is alert, oriented, cooperative and in no apparent distress.  Head:normocephalic and atraumatic.    EENT:  PERRLA.  No conjunctival injections.   Septum was midline, mucosa was without erythema, exudates or cobblestoning.  No thrush was noted.   Neck: Supple without thyromegaly. No elevated JVP. Trachea was midline.  Respiratory: Chest was symmetrical without dullness to percussion.  Breath sounds  DC instructions

## 2024-08-25 NOTE — DISCHARGE INSTRUCTIONS
You came in with tongue swelling and ulceration.    -Please start taking 1 tablet of doxycycline twice daily for 7 days  -Please take 2 tablets of prednisone 20 mg daily for 5 days.  -Please continue to use Magic mouthwash for irritation  -Please follow-up with ENT outpatient as written within 2 weeks.  -Please follow-up with PCP for continued care within 5 to 7 days.    If you begin to experience any symptoms such as chest pain shortness of breath nausea vomiting dizziness drowsiness abdominal pain loss of consciousness or any other symptoms you find concerning please come to the ED for follow-up evaluation.    If you have been given medication please take them as prescribed. Do not take more medication than recommended at any given time.     Please follow-up with your primary care provider within 5 to 7 days for continued care.     Please feel free return to the hospital if your symptoms worsen or any new concerning symptoms develop.  Follow-up with your primary care physician as needed for all other the concerns.

## 2024-08-25 NOTE — PLAN OF CARE
Problem: Discharge Planning  Goal: Discharge to home or other facility with appropriate resources  8/25/2024 0345 by Gracie Biggs, RN  Outcome: Progressing  Flowsheets (Taken 8/24/2024 2000)  Discharge to home or other facility with appropriate resources:   Identify barriers to discharge with patient and caregiver   Arrange for needed discharge resources and transportation as appropriate   Identify discharge learning needs (meds, wound care, etc)   Arrange for interpreters to assist at discharge as needed   Refer to discharge planning if patient needs post-hospital services based on physician order or complex needs related to functional status, cognitive ability or social support system     Problem: Pain  Goal: Verbalizes/displays adequate comfort level or baseline comfort level  8/25/2024 0345 by Gracie Biggs, RN  Outcome: Progressing

## 2024-08-25 NOTE — ACP (ADVANCE CARE PLANNING)
Advance Care Planning     Advance Care Planning Inpatient Note  [facility] Department    Today's Date: 8/25/2024  Unit: [facility] 5A STEPDOWN    Received request from HealthCare Provider.  Upon review of chart and communication with care team, patient's decision making abilities are not in question.. Patient was/were present in the room during visit.    Goals of ACP Conversation:  Complete documents    Health Care Decision Makers:       Primary Decision Maker: Juan Clayton - Spouse - [phone]    Secondary Decision Maker: Annita Egan - Brother/Sister - [phone]    Supplemental (Other) Decision Maker: Jordyn Parra - Brother/Sister - [phone]  Summary:  Completed New Documents    Advance Care Planning Documents (Patient Wishes):  Healthcare Power of /Advance Directive Appointment of Health Care Agent  Living Will/Advance Directive     Assessment:  Pt was in good spirits and engaged well in the conversation.     Interventions:  Provided education on documents for clarity and greater understanding  Assisted in the completion of documents according to patient's wishes at this time  Encouraged ongoing ACP conversation with future decision makers and loved ones    Care Preferences Communicated:   No    Outcomes/Plan:  New advance directive completed.  Returned original document(s) to patient, as well as copies for distribution to appointed agents  Copy of advance directive given to staff to scan into medical record.    Electronically signed by AVELINA Colvin on 8/25/2024 at 10:36 AM

## 2024-08-25 NOTE — CARE COORDINATION
Discharge Report    [facility]  Clinical Case Management Department  Written by: Victoria Gonzales RN    Patient Name: Mendoza Egan  Attending Provider: No att. providers found  Admit Date: 2024  7:58 AM  MRN: [id number]  Account: [id number]                     : 1980  Discharge Date: 2024      Disposition: home    Victoria Gonzales RN

## 2024-08-26 ENCOUNTER — TELEPHONE (OUTPATIENT)
Dept: INTERNAL MEDICINE | Age: 44
End: 2024-08-26

## 2024-08-26 LAB
EKG ATRIAL RATE: 57 BPM
EKG ATRIAL RATE: 61 BPM
EKG ATRIAL RATE: 68 BPM
EKG P AXIS: 34 DEGREES
EKG P AXIS: 44 DEGREES
EKG P AXIS: 46 DEGREES
EKG P-R INTERVAL: 134 MS
EKG P-R INTERVAL: 138 MS
EKG P-R INTERVAL: 138 MS
EKG Q-T INTERVAL: 420 MS
EKG Q-T INTERVAL: 438 MS
EKG Q-T INTERVAL: 446 MS
EKG QRS DURATION: 110 MS
EKG QRS DURATION: 114 MS
EKG QRS DURATION: 88 MS
EKG QTC CALCULATION (BAZETT): 422 MS
EKG QTC CALCULATION (BAZETT): 434 MS
EKG QTC CALCULATION (BAZETT): 465 MS
EKG R AXIS: 19 DEGREES
EKG R AXIS: 23 DEGREES
EKG R AXIS: 6 DEGREES
EKG T AXIS: 13 DEGREES
EKG T AXIS: 16 DEGREES
EKG T AXIS: 5 DEGREES
EKG VENTRICULAR RATE: 57 BPM
EKG VENTRICULAR RATE: 61 BPM
EKG VENTRICULAR RATE: 68 BPM
HAV IGM SERPL QL IA: NONREACTIVE
HBV CORE IGM SERPL QL IA: NONREACTIVE
HBV SURFACE AG SERPL QL IA: NONREACTIVE
HCV AB SERPL QL IA: NONREACTIVE

## 2024-08-26 NOTE — TELEPHONE ENCOUNTER
Patients  placed call to office stating he was seen by Dr Bauer in the hospital and was supposed be prescribed pain medication for abscess in mouth. Please advise    Writer also advised patients  to make follow up with pcp but they are unable to reach his practice at this time

## 2024-08-27 LAB
ALBUMIN PERCENT: 58 % (ref 45–65)
ALBUMIN SERPL-MCNC: 3.7 G/DL (ref 3.2–5.2)
ALPHA 2 PERCENT: 12 % (ref 6–13)
ALPHA1 GLOB SERPL ELPH-MCNC: 0.4 G/DL (ref 0.1–0.4)
ALPHA1 GLOB SERPL ELPH-MCNC: 5 % (ref 3–6)
ALPHA2 GLOB SERPL ELPH-MCNC: 0.8 G/DL (ref 0.5–0.9)
ANA SER QL IA: NEGATIVE
B-GLOBULIN SERPL ELPH-MCNC: 0.7 G/DL (ref 0.5–1.1)
B-GLOBULIN SERPL ELPH-MCNC: 10 % (ref 11–19)
C1INH SERPL-MCNC: 39 MG/DL (ref 21–38)
DSDNA IGG SER QL IA: <0.5 IU/ML
GAMMA GLOB SERPL ELPH-MCNC: 1 G/DL (ref 0.5–1.5)
GAMMA GLOBULIN %: 15 % (ref 9–20)
NUCLEAR IGG SER IA-RTO: <0.1 U/ML
P E INTERPRETATION, U: NORMAL
PATHOLOGIST: ABNORMAL
PATHOLOGIST: NORMAL
PROT PATTERN SERPL ELPH-IMP: ABNORMAL
PROT SERPL-MCNC: 6.5 G/DL (ref 6.6–8.7)
SPECIMEN TYPE: NORMAL
TOTAL PROT. SUM,%: 100 % (ref 98–102)
TOTAL PROT. SUM: 6.6 G/DL (ref 6.3–8.2)
URINE TOTAL PROTEIN: 9 MG/DL

## 2024-08-27 NOTE — DISCHARGE SUMMARY
[facility]     Department of Internal Medicine - Staff Internal Medicine Teaching Service    INPATIENT DISCHARGE SUMMARY      Patient Identification:  Mendoza Egan is a 44 y.o. male.  :  1980  MRN: [id number]     Acct: [id number]   PCP: Paul Coleman MD  Admit Date:  2024  Discharge date and time: 2024  4:01 PM   Attending Provider: No att. providers found                                     ACTIVE DISCHARGE DIAGNOSES     Hospital Problem Lists:  Principal Problem:    Tongue swelling  Active Problems:    Glossitis  Resolved Problems:    * No resolved hospital problems. *      HOSPITAL STAY     Brief Inpatient course:   Mendoza Egan is a 44 y.o. male who was admitted for the management of Tongue swelling, presented to the emergency department with painful left heel atraumatic tongue swelling.  According to patient he was all right until few days back when he accidentally bite his tongue during eating afterwards he developed tongue ulcer.  Then on Tuesday he was eating ice stress and dinner and afterwards he developed worsening of tongue ulcer with tongue swelling.  He had difficulty when he tried to sleep.  He denied for shortness of breath, wheezing, skin rash, chest pain, palpitation, leg edema.  He was evaluated in the ED by ENT and he was diagnosed with glossitis with tongue abscess and IV antibiotics were given with resolution of patient's symptoms.  He was medically stable on discharge.      Procedures/ Significant Interventions:    None    Consults:     Consults:     Final Specialist Recommendations/Findings:   IP CONSULT TO OTOLARYNGOLOGY  IP CONSULT TO INTERNAL MEDICINE  IP CONSULT TO CASE MANAGEMENT  IP CONSULT TO SPIRITUAL SERVICES      Any Hospital Acquired Infections: none    Discharge Functional Status:  stable    DISCHARGE PLAN     Disposition: home    Patient Instructions:   Discharge Medication List as of 2024  1:59 PM        START taking these

## 2024-08-29 LAB
MICROORGANISM SPEC CULT: NORMAL
MICROORGANISM SPEC CULT: NORMAL
SERVICE CMNT-IMP: NORMAL
SERVICE CMNT-IMP: NORMAL
SPECIMEN DESCRIPTION: NORMAL
SPECIMEN DESCRIPTION: NORMAL

## 2024-09-17 ENCOUNTER — HOSPITAL ENCOUNTER (OUTPATIENT)
Age: 44
Setting detail: OBSERVATION
Discharge: HOME | End: 2024-09-17
Attending: EMERGENCY MEDICINE | Admitting: PSYCHIATRY & NEUROLOGY
Payer: COMMERCIAL

## 2024-09-17 ENCOUNTER — APPOINTMENT (OUTPATIENT)
Dept: MRI IMAGING | Age: 44
End: 2024-09-17
Payer: COMMERCIAL

## 2024-09-17 ENCOUNTER — APPOINTMENT (OUTPATIENT)
Dept: CT IMAGING | Age: 44
End: 2024-09-17
Payer: COMMERCIAL

## 2024-09-17 VITALS
OXYGEN SATURATION: 97 % | TEMPERATURE: 98.8 F | SYSTOLIC BLOOD PRESSURE: 122 MMHG | HEART RATE: 64 BPM | DIASTOLIC BLOOD PRESSURE: 84 MMHG | RESPIRATION RATE: 20 BRPM

## 2024-09-17 DIAGNOSIS — R29.810 FACIAL DROOP: Primary | ICD-10-CM

## 2024-09-17 DIAGNOSIS — K14.0 GLOSSITIS: ICD-10-CM

## 2024-09-17 PROBLEM — R29.818 FOCAL NEUROLOGICAL DEFICIT: Status: ACTIVE | Noted: 2024-09-17

## 2024-09-17 PROBLEM — H02.402 PTOSIS OF LEFT EYELID: Status: ACTIVE | Noted: 2024-09-17

## 2024-09-17 LAB
ANION GAP SERPL CALCULATED.3IONS-SCNC: 12 MMOL/L (ref 9–16)
BASOPHILS # BLD: 0.03 K/UL (ref 0–0.2)
BASOPHILS NFR BLD: 1 % (ref 0–2)
BUN SERPL-MCNC: 13 MG/DL (ref 6–20)
CALCIUM SERPL-MCNC: 8.8 MG/DL (ref 8.6–10.4)
CHLORIDE SERPL-SCNC: 101 MMOL/L (ref 98–107)
CK SERPL-CCNC: 78 U/L (ref 39–308)
CO2 SERPL-SCNC: 24 MMOL/L (ref 20–31)
CREAT SERPL-MCNC: 1.4 MG/DL (ref 0.7–1.2)
CRP SERPL HS-MCNC: <3 MG/L (ref 0–5)
EOSINOPHIL # BLD: 0.09 K/UL (ref 0–0.44)
EOSINOPHILS RELATIVE PERCENT: 2 % (ref 1–4)
ERYTHROCYTE [DISTWIDTH] IN BLOOD BY AUTOMATED COUNT: 12.4 % (ref 11.8–14.4)
ERYTHROCYTE [SEDIMENTATION RATE] IN BLOOD BY PHOTOMETRIC METHOD: 1 MM/HR (ref 0–15)
GFR, ESTIMATED: 66 ML/MIN/1.73M2
GLUCOSE SERPL-MCNC: 78 MG/DL (ref 74–99)
HCT VFR BLD AUTO: 43.2 % (ref 40.7–50.3)
HGB BLD-MCNC: 15 G/DL (ref 13–17)
IMM GRANULOCYTES # BLD AUTO: <0.03 K/UL (ref 0–0.3)
IMM GRANULOCYTES NFR BLD: 0 %
INR PPP: 0.9
LYMPHOCYTES NFR BLD: 1.66 K/UL (ref 1.1–3.7)
LYMPHOCYTES RELATIVE PERCENT: 38 % (ref 24–43)
MCH RBC QN AUTO: 32.1 PG (ref 25.2–33.5)
MCHC RBC AUTO-ENTMCNC: 34.7 G/DL (ref 28.4–34.8)
MCV RBC AUTO: 92.5 FL (ref 82.6–102.9)
MONOCYTES NFR BLD: 0.36 K/UL (ref 0.1–1.2)
MONOCYTES NFR BLD: 8 % (ref 3–12)
MYOGLOBIN SERPL-MCNC: 31 NG/ML (ref 28–72)
NEUTROPHILS NFR BLD: 51 % (ref 36–65)
NEUTS SEG NFR BLD: 2.22 K/UL (ref 1.5–8.1)
NRBC BLD-RTO: 0 PER 100 WBC
PARTIAL THROMBOPLASTIN TIME: 28.1 SEC (ref 23–36.5)
PLATELET # BLD AUTO: 229 K/UL (ref 138–453)
PMV BLD AUTO: 9.3 FL (ref 8.1–13.5)
POTASSIUM SERPL-SCNC: 4 MMOL/L (ref 3.7–5.3)
PROTHROMBIN TIME: 12.1 SEC (ref 11.7–14.9)
RBC # BLD AUTO: 4.67 M/UL (ref 4.21–5.77)
SARS-COV-2 RDRP RESP QL NAA+PROBE: NOT DETECTED
SODIUM SERPL-SCNC: 137 MMOL/L (ref 136–145)
SPECIMEN DESCRIPTION: NORMAL
TROPONIN I SERPL HS-MCNC: <6 NG/L (ref 0–22)
WBC OTHER # BLD: 4.4 K/UL (ref 3.5–11.3)

## 2024-09-17 PROCEDURE — 85730 THROMBOPLASTIN TIME PARTIAL: CPT

## 2024-09-17 PROCEDURE — G0378 HOSPITAL OBSERVATION PER HR: HCPCS

## 2024-09-17 PROCEDURE — 99205 OFFICE O/P NEW HI 60 MIN: CPT | Performed by: PSYCHIATRY & NEUROLOGY

## 2024-09-17 PROCEDURE — 84484 ASSAY OF TROPONIN QUANT: CPT

## 2024-09-17 PROCEDURE — 85652 RBC SED RATE AUTOMATED: CPT

## 2024-09-17 PROCEDURE — 85025 COMPLETE CBC W/AUTO DIFF WBC: CPT

## 2024-09-17 PROCEDURE — 82550 ASSAY OF CK (CPK): CPT

## 2024-09-17 PROCEDURE — 83874 ASSAY OF MYOGLOBIN: CPT

## 2024-09-17 PROCEDURE — 86140 C-REACTIVE PROTEIN: CPT

## 2024-09-17 PROCEDURE — 6360000004 HC RX CONTRAST MEDICATION: Performed by: STUDENT IN AN ORGANIZED HEALTH CARE EDUCATION/TRAINING PROGRAM

## 2024-09-17 PROCEDURE — 99222 1ST HOSP IP/OBS MODERATE 55: CPT | Performed by: PSYCHIATRY & NEUROLOGY

## 2024-09-17 PROCEDURE — 82553 CREATINE MB FRACTION: CPT

## 2024-09-17 PROCEDURE — 70551 MRI BRAIN STEM W/O DYE: CPT

## 2024-09-17 PROCEDURE — 80048 BASIC METABOLIC PNL TOTAL CA: CPT

## 2024-09-17 PROCEDURE — 93005 ELECTROCARDIOGRAM TRACING: CPT | Performed by: PSYCHIATRY & NEUROLOGY

## 2024-09-17 PROCEDURE — 85610 PROTHROMBIN TIME: CPT

## 2024-09-17 PROCEDURE — 99285 EMERGENCY DEPT VISIT HI MDM: CPT

## 2024-09-17 PROCEDURE — 70496 CT ANGIOGRAPHY HEAD: CPT

## 2024-09-17 PROCEDURE — 87635 SARS-COV-2 COVID-19 AMP PRB: CPT

## 2024-09-17 PROCEDURE — 70450 CT HEAD/BRAIN W/O DYE: CPT

## 2024-09-17 RX ORDER — MAGNESIUM SULFATE IN WATER 40 MG/ML
2000 INJECTION, SOLUTION INTRAVENOUS PRN
Status: CANCELLED | OUTPATIENT
Start: 2024-09-17

## 2024-09-17 RX ORDER — POLYETHYLENE GLYCOL 3350 17 G/17G
17 POWDER, FOR SOLUTION ORAL DAILY PRN
Status: CANCELLED | OUTPATIENT
Start: 2024-09-17

## 2024-09-17 RX ORDER — SODIUM CHLORIDE 9 MG/ML
INJECTION, SOLUTION INTRAVENOUS PRN
Status: CANCELLED | OUTPATIENT
Start: 2024-09-17

## 2024-09-17 RX ORDER — SODIUM CHLORIDE 0.9 % (FLUSH) 0.9 %
5-40 SYRINGE (ML) INJECTION EVERY 12 HOURS SCHEDULED
Status: CANCELLED | OUTPATIENT
Start: 2024-09-17

## 2024-09-17 RX ORDER — ACETAMINOPHEN 650 MG/1
650 SUPPOSITORY RECTAL EVERY 6 HOURS PRN
Status: CANCELLED | OUTPATIENT
Start: 2024-09-17

## 2024-09-17 RX ORDER — POTASSIUM CHLORIDE 1500 MG/1
40 TABLET, EXTENDED RELEASE ORAL PRN
Status: CANCELLED | OUTPATIENT
Start: 2024-09-17

## 2024-09-17 RX ORDER — SODIUM CHLORIDE 0.9 % (FLUSH) 0.9 %
5-40 SYRINGE (ML) INJECTION PRN
Status: CANCELLED | OUTPATIENT
Start: 2024-09-17

## 2024-09-17 RX ORDER — IOPAMIDOL 755 MG/ML
75 INJECTION, SOLUTION INTRAVASCULAR
Status: COMPLETED | OUTPATIENT
Start: 2024-09-17 | End: 2024-09-17

## 2024-09-17 RX ORDER — ONDANSETRON 2 MG/ML
4 INJECTION INTRAMUSCULAR; INTRAVENOUS EVERY 6 HOURS PRN
Status: CANCELLED | OUTPATIENT
Start: 2024-09-17

## 2024-09-17 RX ORDER — POTASSIUM CHLORIDE 7.45 MG/ML
10 INJECTION INTRAVENOUS PRN
Status: CANCELLED | OUTPATIENT
Start: 2024-09-17

## 2024-09-17 RX ORDER — ONDANSETRON 4 MG/1
4 TABLET, ORALLY DISINTEGRATING ORAL EVERY 8 HOURS PRN
Status: CANCELLED | OUTPATIENT
Start: 2024-09-17

## 2024-09-17 RX ORDER — ACETAMINOPHEN 325 MG/1
650 TABLET ORAL EVERY 6 HOURS PRN
Status: CANCELLED | OUTPATIENT
Start: 2024-09-17

## 2024-09-17 RX ADMIN — IOPAMIDOL 75 ML: 755 INJECTION, SOLUTION INTRAVENOUS at 15:22

## 2024-09-17 ASSESSMENT — ENCOUNTER SYMPTOMS
DIARRHEA: 0
SHORTNESS OF BREATH: 0
COUGH: 1
NAUSEA: 0
BACK PAIN: 0
ABDOMINAL PAIN: 0
VOMITING: 0
WHEEZING: 0

## 2024-09-17 NOTE — ED NOTES
ED to inpatient nurses report      Chief Complaint:  Chief Complaint   Patient presents with   • Facial Droop     Present to ED from: home    MOA:     LOC: alert and orientated to name, place, date  Mobility: Independent  Oxygen Baseline: na    Current needs required: na  Pending ED orders: mri  Present condition: stable    Why did the patient come to the ED? Pt to ED with c/o L sided facial droop since yesterday 1800. Pt states \"I felt a little weird and noticed my mouth was drooping.\" Pt alert, oriented, and in NAD. Pt denies HA, dizziness, CP, or SOB.   What is the plan? tbd  Any procedures or intervention occur? no  Any safety concerns??    Mental Status:  Level of Consciousness: Alert (0)    Psych Assessment:   Psychosocial  Psychosocial (WDL): Within Defined Limits  Vital signs   Vitals:    09/17/24 1333 09/17/24 1500 09/17/24 1542 09/17/24 1636   BP: (!) 150/94 129/88 122/84    Pulse: 73 65 62 64   Resp: 18 16  20   Temp: 98.8 °F (37.1 °C)      TempSrc: Oral      SpO2: 96% 98% 97% 97%        Vitals:  Patient Vitals for the past 24 hrs:   BP Temp Temp src Pulse Resp SpO2   09/17/24 1636 -- -- -- 64 20 97 %   09/17/24 1542 122/84 -- -- 62 -- 97 %   09/17/24 1500 129/88 -- -- 65 16 98 %   09/17/24 1333 (!) 150/94 98.8 °F (37.1 °C) Oral 73 18 96 %      Visit Vitals  /84   Pulse 64   Temp 98.8 °F (37.1 °C) (Oral)   Resp 20   SpO2 97%        LDAs:   Peripheral IV 09/17/24 Right Antecubital (Active)   Site Assessment Clean, dry & intact 09/17/24 1400   Line Status Blood return noted 09/17/24 1400       Ambulatory Status:  Presents to emergency department  because of falls (Syncope, seizure, or loss of consciousness): No, Age > 70: No, Altered Mental Status, Intoxication with alcohol or substance confusion (Disorientation, impaired judgment, poor safety awaremess, or inability to follow instructions): No, Impaired Mobility: Ambulates or transfers with assistive devices or assistance; Unable to ambulate or  transer.: Yes, Nursing Judgement: Yes    Diagnosis:  DISPOSITION Admitted 09/17/2024 06:17:05 PM   Final diagnoses:   Facial droop        Consults:  IP CONSULT TO STROKE TEAM     Treatment Team:   Treatment Team:   Socorro Vides MD Ard, JINNY Grahma Jamal, DO Douglas, Taryn, DO    Treatment:  ED Course as of 09/17/24 1825   Tue Sep 17, 2024   1502 SARS-CoV-2, Rapid: Not Detected [GC]   1622 CT head without acute intracranial process [TD]   1650 Sed Rate, Automated: 1 [GC]   1650 CRP: <3.0 [GC]   1651 Stroke team ordered inflammatory markers, Sjogren antibodies, MRI.  Patient was signed out to co-resident.  Stroke team to guide management [GC]   1744 CTA without acute process [TD]   1823 Neurology to admit the patient [TD]      ED Course User Index  [GC] Manpreet León DO  [TD] Gale Brownlee DO          Skin Assessment:        Pain Score:         SOCIAL HISTORY       Social History     Socioeconomic History   • Marital status: Single     Spouse name: None   • Number of children: None   • Years of education: None   • Highest education level: None   Tobacco Use   • Smoking status: Never   • Smokeless tobacco: Never   Vaping Use   • Vaping status: Never Used     Social Determinants of Health     Food Insecurity: No Food Insecurity (8/25/2024)    Hunger Vital Sign    • Worried About Running Out of Food in the Last Year: Never true    • Ran Out of Food in the Last Year: Never true   Transportation Needs: No Transportation Needs (8/25/2024)    PRAPARE - Transportation    • Lack of Transportation (Medical): No    • Lack of Transportation (Non-Medical): No   Housing Stability: Low Risk  (8/25/2024)    Housing Stability Vital Sign    • Unable to Pay for Housing in the Last Year: No    • Number of Times Moved in the Last Year: 0    • Homeless in the Last Year: No       FAMILY HISTORY     History reviewed. No pertinent family history.    ALLERGIES     Shellfish allergy    CURRENT MEDICATIONS        Previous Medications    No medications on file     Orders Placed This Encounter   Medications   • iopamidol (ISOVUE-370) 76 % injection 75 mL       SURGICAL HISTORY     History reviewed. No pertinent surgical history.    PAST MEDICAL HISTORY     History reviewed. No pertinent past medical history.    Labs:  Labs Reviewed   STROKE PANEL - Abnormal; Notable for the following components:       Result Value    Creatinine 1.4 (*)     All other components within normal limits   COVID-19, RAPID   SEDIMENTATION RATE   C-REACTIVE PROTEIN   SJOGRENS SYNDROME-A EXTRACTABLE NUCLEAR ANTIBODY   SJOGRENS SYNDROME-B EXTRACTABLE NUCLEAR ANTIBODY       Electronically signed by Gladys Shirley RN on 9/17/2024 at 6:17 PM

## 2024-09-17 NOTE — PROGRESS NOTES
[facility]     Emergency/Trauma Note    PATIENT NAME: Mendoza Egan    Shift date: 9/17/2024   Shift day: Tuesday   Shift # 1    Room # 24/24   Name: Mendoza Egan            Age: 44 y.o.  Gender: male          Mormon: Non-Yazidi   Place of Confucianist:     Trauma/Incident type: Stroke Consult  Admit Date & Time: 9/17/2024  1:50 PM    PATIENT/EVENT DESCRIPTION:  Mendoza Egan is a 44 y.o. male who arrived via the lobby. Pt to be admitted to 24/24.         SPIRITUAL ASSESSMENT-INTERVENTION-OUTCOME:  Writer is familiar with this pt from a past hospitalization. Pt engaged well in conversation. He explained the symptoms he was having yesterday and brought himself here today. Pt is well-supported by his . Writer provided support through active listening and words of affirmation.      PATIENT BELONGINGS:  No belongings noted    ANY BELONGINGS OF SIGNIFICANT VALUE NOTED:      REGISTRATION STAFF NOTIFIED?  No      WHAT IS YOUR SPIRITUAL CARE PLAN FOR THIS PATIENT?:   [facility] health team will remain available for spiritual and emotional support.     Electronically signed by Chaplain Mairi, on 9/17/2024 at 3:09 PM.  [facility]  [phone]      09/17/24 1509   Encounter Summary   Encounter Overview/Reason Crisis   Service Provided For Patient   Referral/Consult From Multi-disciplinary team   Support System Spouse;Family members   Last Encounter  09/17/24   Complexity of Encounter Moderate   Begin Time 1430   End Time  1440   Total Time Calculated 10 min   Crisis   Type Code Stroke  (Consult)   Assessment/Intervention/Outcome   Assessment Calm;Coping   Intervention Active listening;Explored/Affirmed feelings, thoughts, concerns   Outcome Engaged in conversation;Expressed feelings, needs, and concerns;Expressed Gratitude;Receptive

## 2024-09-17 NOTE — ED PROVIDER NOTES
[facility] ED  Emergency Department  Emergency Medicine Resident Turn-Over     Care of Mendoza Egan was assumed from Dr. León and is being seen for Facial Droop  .  The patient's initial evaluation and plan have been discussed with the prior provider who initially evaluated the patient.     EMERGENCY DEPARTMENT COURSE / MEDICAL DECISION MAKING:       MEDICATIONS GIVEN:  Orders Placed This Encounter   Medications    iopamidol (ISOVUE-370) 76 % injection 75 mL       LABS / RADIOLOGY:     Labs Reviewed   STROKE PANEL - Abnormal; Notable for the following components:       Result Value    Creatinine 1.4 (*)     All other components within normal limits   COVID-19, RAPID   SEDIMENTATION RATE   C-REACTIVE PROTEIN   SJOGRENS SYNDROME-A EXTRACTABLE NUCLEAR ANTIBODY   SJOGRENS SYNDROME-B EXTRACTABLE NUCLEAR ANTIBODY       CT HEAD WO CONTRAST    Result Date: 9/17/2024  EXAMINATION: CT OF THE HEAD WITHOUT CONTRAST  9/17/2024 2:18 pm TECHNIQUE: CT of the head was performed without the administration of intravenous contrast. Automated exposure control, iterative reconstruction, and/or weight based adjustment of the mA/kV was utilized to reduce the radiation dose to as low as reasonably achievable. COMPARISON: None. HISTORY: ORDERING SYSTEM PROVIDED HISTORY: left facial droop TECHNOLOGIST PROVIDED HISTORY: left facial droop Decision Support Exception - unselect if not a suspected or confirmed emergency medical condition->Emergency Medical Condition (MA) FINDINGS: BRAIN/VENTRICLES: There is no acute intracranial hemorrhage, mass effect or midline shift.  No abnormal extra-axial fluid collection.  The gray-white differentiation is maintained without evidence of an acute infarct.  There is no evidence of hydrocephalus. ORBITS: The visualized portion of the orbits demonstrate no acute abnormality. SINUSES: The visualized paranasal sinuses and mastoid air cells demonstrate no acute abnormality. SOFT TISSUES/SKULL:

## 2024-09-17 NOTE — ED PROVIDER NOTES
[facility]     Emergency Department     Faculty Note/ Attestation      2:13 PM EDT  Pt Name: Mendoza Egan                                       MRN: [id number]  Birthdate 1980  Date of evaluation: 9/17/2024  Patients PCP:    Paul Coleman MD    Attestation  I performed a history and physical examination of the patient/ or directly observed  and discussed management with the resident. I reviewed the resident’s note and agree with the documented findings and plan of care. Any areas of disagreement are noted on the chart. I was personally present for the key portions of any procedures. I have documented in the chart those procedures where I was not present during the key portions. I have reviewed the emergency nurses triage note. I agree with the chief complaint, past medical history, past surgical history, allergies, medications, social and family history as documented unless otherwise noted below.    For Physician Assistant/ Nurse Practitioner cases/documentation I have personally evaluated this patient and have completed at least one if not all key elements of the E/M (history, physical exam, and MDM). Additional findings are as noted.       Initial Screens:     -------------------------------------     ----------------------------------------  Adri Coma Scale  Eye Opening: Spontaneous  Best Verbal Response: Oriented  Best Motor Response: Obeys commands  Adri Coma Scale Score: 15  ------------------------------------------------------------------------------------------------------------  Vitals:    Vitals:    09/17/24 1333   BP: (!) 150/94   Pulse: 73   Resp: 18   Temp: 98.8 °F (37.1 °C)   TempSrc: Oral   SpO2: 96%       Chief Complaint      Chief Complaint   Patient presents with    Facial Droop          oral temperature is 98.8 °F (37.1 °C). His blood pressure is 150/94 (abnormal) and his pulse is 73. His respiration is 18 and oxygen saturation is 96%.            DIAGNOSTIC  RESULTS       RADIOLOGY:   CT HEAD WO CONTRAST    (Results Pending)   CTA HEAD NECK W CONTRAST    (Results Pending)         LABS:  Labs Reviewed   COVID-19, RAPID         EMERGENCY DEPARTMENT COURSE:     -------------------------      BP: (!) 150/94, Temp: 98.8 °F (37.1 °C), Pulse: 73, Respirations: 18    System Problem List Noted    Patient Active Problem List   Diagnosis    Tongue swelling    Glossitis         Active ED Problem List [facility]/  MDM  ---------------------  Medical Decision Making  Amount and/or Complexity of Data Reviewed  Radiology: ordered.    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  Plan and orders as noted above  Consult with stroke team  rwk            No notes of EC Admission Criteria type on file.          Ernesto Martinez MD,, MD, F.A.C.E.P.  Attending Emergency Physician         Ernesto Martinez MD  09/17/24 8170

## 2024-09-17 NOTE — CONSULTS
[facility] Neu[facility]   IN-PATIENT SERVICE   [facility]    Neurology Consult Note            Date:   9/17/2024  Patient name:  Mendoza Egan  Date of admission:  9/17/2024  1:50 PM  MRN:   [id number]  Account:  [id number]  YOB: 1980  PCP:    Paul Coleman MD  Room:   24/24  Code Status:    Prior    Chief Complaint:     Chief Complaint   Patient presents with    Facial Droop       History Obtained From:     patient, electronic medical record    History of Present Illness:     43 y/o male with past medical history of left sided glossitis in August 2024 s/p Doxycycline presents to [facility] for concern for left lower facial droop.      Patient' symptoms started at 6 PM on 9/16/24, acute onset. Patient noticed liquids spilling on the left side of the mouth, and noticed left facial droop. Patient denies visual disturbance, motor or sensory deficits. He had persistence of symptoms and decided to present to ER for evaluation. CT head and CTA are unremarkable. NIHSS 1 for left lower facial droop.     On chart review, patient was admitted from 8/24 to 8/25/24 for left sided tongue swelling. Patient bit his tongue and noticed an ulcer present shortly after. He ate raw oysters and developed tongue swelling develop over 2 weeks. He was seen by ENT during that admission and recommended Doxycycline regimen.      Prior to arrival patient was on  Antiplatelets/anticoagulants: none   Statins: none        Past Medical History:     History reviewed. No pertinent past medical history.     Past Surgical History:     History reviewed. No pertinent surgical history.     Medications Prior to Admission:     Prior to Admission medications    Not on File        Allergies:     Shellfish allergy    Social History:     Tobacco:    reports that he has never smoked. He has never used smokeless tobacco.  Alcohol:      has no history on file for alcohol use.  Drug Use:  has no history on file for drug  co-morbidities listed above, severity of signs and symptoms as outlined, requirement for current medical therapies and most importantly because of direct risk to patient if care not provided in a hospital setting.    Mateo Niño DO  Neurology Resident PGY-4  9/17/2024  5:03 PM    Copy sent to Paul Schneider MD

## 2024-09-17 NOTE — CONSULTS
STROKE CONSULT  NOTE            Date:   9/17/2024  Patient name:  Mendoza Egan  Date of admission:  9/17/2024  YOB: 1980      Chief Complaint:     Chief Complaint   Patient presents with    Facial Droop       Reason for Consult:      Left facial droop    History of Present Illness:     43 y/o male with past medical history of left sided glossitis in August 2024 s/p Doxycycline presents to [facility] for concern for left lower facial droop.     Patient' symptoms started at 6 PM on 9/16/24, acute onset. Patient noticed liquids spilling on the left side of the mouth, and noticed left facial droop. Patient denies visual disturbance, motor or sensory deficits. He had persistence of symptoms and decided to present to ER for evaluation. CT head and CTA are unremarkable. NIHSS 1 for left lower facial droop.     On chart review, patient was admitted from 8/24 to 8/25/24 for left sided tongue swelling. Patient bit his tongue and noticed an ulcer present shortly after. He ate raw oysters and developed tongue swelling develop over 2 weeks. He was seen by ENT during that admission and recommended Doxycycline regimen.     Prior to arrival patient was on  Antiplatelets/anticoagulants: none   Statins: none      Past Medical History:     History reviewed. No pertinent past medical history.     Past Surgical History:     History reviewed. No pertinent surgical history.     Medications Prior to Admission:     Prior to Admission medications    Not on File        Allergies:     Shellfish allergy    Social History:     Tobacco:    reports that he has never smoked. He has never used smokeless tobacco.  Alcohol:      has no history on file for alcohol use.  Drug Use:  has no history on file for drug use.    Family History:     History reviewed. No pertinent family history.    Review of Systems:       Constitutional Negative for fever and chills   HEENT Negative for ear discharge, ear pain, nosebleed   Eyes Negative        XII - midline tongue without atrophy or fasciculation     Motor function  Strength:   5/5 RUE, 5/5 RLE  5/5 LUE, 5/5  LLE  Normal bulk and tone.      Sensory function Intact to touch, pin, vibration, proprioception throughout     Cerebellar Intact finger-nose-finger testing.   Intact heel-shin testing.   No dysdiadochokinesia present.   No tremors                        Reflex function 2/4 symmetric throughout .   Downgoing plantar response bilaterally.   (-)Roldan's sign bilaterally      Gait                  Normal station and gait.  Normal Tandem, tip toes and heel walking         NIH STROKE SCALE:    Time Performed:      1a.  Level of consciousness:  0 - alert; keenly responsive  1b.  Level of consciousness questions:  0 - answers both questions correctly  1c.  Level of consciousness questions:  0 - performs both tasks correctly  2.    Best Gaze:  0 - normal  3.    Visual:  0 - no visual loss  4.    Facial Palsy:  1 - minor paralysis (flattened nasolabial fold, asymmetric on smiling)  5a.  Motor left arm:  0 - no drift, limb holds 90 (or 45) degrees for full 10 seconds  5b.  Motor right arm:  0 - no drift, limb holds 90 (or 45) degrees for full 10 seconds  6a.  Motor left le - no drift; leg holds 30 degree position for full 5 seconds  6b.  Motor right le - no drift; leg holds 30 degree position for full 5 seconds  7.    Limb Ataxia:  0 - absent  8.    Sensory:  0 - normal; no sensory loss  9.    Best Language:  0 - no aphasia, normal  10.  Dysarthria:  0 - normal  11.  Extinction and Inattention:  0 - no abnormality    TOTAL:  1    Diagnostics:      Laboratory Testing:    CBC: No results for input(s): \"WBC\", \"HGB\", \"PLT\" in the last 72 hours.    BMP:  No results for input(s): \"NA\", \"K\", \"CL\", \"CO2\", \"BUN\", \"CREATININE\", \"GLUCOSE\" in the last 72 hours.      Lab Results   Component Value Date    ALT 34 2024    AST 28 2024    [id number] 376 2024

## 2024-09-17 NOTE — ED NOTES
Pt to ED with c/o L sided facial droop since yesterday 1800. Pt states \"I felt a little weird and noticed my mouth was drooping.\" Pt alert, oriented, and in NAD. Pt denies HA, dizziness, CP, or SOB.

## 2024-09-17 NOTE — ED PROVIDER NOTES
[facility]  FACULTY HANDOFF       Handoff taken on the following patient from prior Attending Physician:  Pt Name: Mendoza Egan  PCP:  Paul Coleman MD    Attestation  I was available and discussed any additional care issues that arose and coordinated the management plans with the resident(s) caring for the patient during my duty period. Any areas of disagreement with resident's documentation of care or procedures are noted on the chart. I was personally present for the key portions of any/all procedures during my duty period. I have documented in the chart those procedures where I was not present during the key portions.           Ry Kraft MD  09/17/24 3058

## 2024-09-17 NOTE — H&P
[facility]   IN-PATIENT SERVICE   [facility]    HISTORY AND PHYSICAL EXAMINATION            Date:   9/17/2024  Patient name:  Mendoza Egan  Date of admission:  9/17/2024  1:50 PM  MRN:   [id number]  Account:  [id number]  YOB: 1980  PCP:    Paul Coleman MD  Room:   24/24  Code Status:    Prior    Chief Complaint:     Chief Complaint   Patient presents with    Facial Droop       History Obtained From:     patient, electronic medical record    History of Present Illness:     43 y/o male with past medical history of left sided glossitis in August 2024 s/p Doxycycline presents to [facility] for concern for left lower facial droop.      Patient' symptoms started at 6 PM on 9/16/24, acute onset. Patient noticed liquids spilling on the left side of the mouth, and noticed left facial droop. Patient denies visual disturbance, motor or sensory deficits. He had persistence of symptoms and decided to present to ER for evaluation. CT head and CTA are unremarkable. NIHSS 1 for left lower facial droop.     On chart review, patient was admitted from 8/24 to 8/25/24 for left sided tongue swelling. Patient bit his tongue and noticed an ulcer present shortly after. He ate raw oysters and developed tongue swelling develop over 2 weeks. He was seen by ENT during that admission and recommended Doxycycline regimen.      Prior to arrival patient was on  Antiplatelets/anticoagulants: none   Statins: none      Past Medical History:     History reviewed. No pertinent past medical history.     Past Surgical History:     History reviewed. No pertinent surgical history.     Medications Prior to Admission:     Prior to Admission medications    Not on File        Allergies:     Shellfish allergy    Social History:     Tobacco:    reports that he has never smoked. He has never used smokeless tobacco.  Alcohol:      has no history on file for alcohol use.  Drug Use:  has no history on file for drug

## 2024-09-18 NOTE — ED NOTES
Patient returned to ED room from MRI. Neuro resident on the way down to talk to patient about discharge

## 2024-09-20 LAB
EKG ATRIAL RATE: 68 BPM
EKG P AXIS: 40 DEGREES
EKG P-R INTERVAL: 142 MS
EKG Q-T INTERVAL: 408 MS
EKG QRS DURATION: 98 MS
EKG QTC CALCULATION (BAZETT): 433 MS
EKG R AXIS: -15 DEGREES
EKG T AXIS: 34 DEGREES
EKG VENTRICULAR RATE: 68 BPM

## 2025-07-07 ENCOUNTER — HOSPITAL ENCOUNTER (EMERGENCY)
Age: 45
Discharge: HOME | End: 2025-07-07
Attending: EMERGENCY MEDICINE
Payer: COMMERCIAL

## 2025-07-07 ENCOUNTER — APPOINTMENT (OUTPATIENT)
Dept: GENERAL RADIOLOGY | Age: 45
End: 2025-07-07
Payer: COMMERCIAL

## 2025-07-07 VITALS
SYSTOLIC BLOOD PRESSURE: 130 MMHG | DIASTOLIC BLOOD PRESSURE: 98 MMHG | RESPIRATION RATE: 16 BRPM | HEART RATE: 63 BPM | OXYGEN SATURATION: 100 % | TEMPERATURE: 98.4 F

## 2025-07-07 DIAGNOSIS — R07.9 CHEST PAIN, UNSPECIFIED TYPE: Primary | ICD-10-CM

## 2025-07-07 LAB
ALBUMIN SERPL-MCNC: 4.5 G/DL (ref 3.5–5.2)
ALBUMIN/GLOB SERPL: 2.1 {RATIO} (ref 1–2.5)
ALP SERPL-CCNC: 48 U/L (ref 40–129)
ALT SERPL-CCNC: 31 U/L (ref 10–50)
ANION GAP SERPL CALCULATED.3IONS-SCNC: 13 MMOL/L (ref 9–16)
AST SERPL-CCNC: 23 U/L (ref 10–50)
BASOPHILS # BLD: <0.03 K/UL (ref 0–0.2)
BASOPHILS NFR BLD: 0 % (ref 0–2)
BILIRUB SERPL-MCNC: 0.3 MG/DL (ref 0–1.2)
BUN SERPL-MCNC: 11 MG/DL (ref 6–20)
CALCIUM SERPL-MCNC: 9.2 MG/DL (ref 8.6–10.4)
CHLORIDE SERPL-SCNC: 101 MMOL/L (ref 98–107)
CO2 SERPL-SCNC: 25 MMOL/L (ref 20–31)
CREAT SERPL-MCNC: 1.1 MG/DL (ref 0.7–1.2)
EOSINOPHIL # BLD: 0.05 K/UL (ref 0–0.44)
EOSINOPHILS RELATIVE PERCENT: 1 % (ref 1–4)
ERYTHROCYTE [DISTWIDTH] IN BLOOD BY AUTOMATED COUNT: 12.3 % (ref 11.8–14.4)
GFR, ESTIMATED: 84 ML/MIN/1.73M2
GLUCOSE SERPL-MCNC: 135 MG/DL (ref 74–99)
HCT VFR BLD AUTO: 44.6 % (ref 40.7–50.3)
HGB BLD-MCNC: 15.3 G/DL (ref 13–17)
IMM GRANULOCYTES # BLD AUTO: <0.03 K/UL (ref 0–0.3)
IMM GRANULOCYTES NFR BLD: 0 %
LYMPHOCYTES NFR BLD: 1.73 K/UL (ref 1.1–3.7)
LYMPHOCYTES RELATIVE PERCENT: 30 % (ref 24–43)
MAGNESIUM SERPL-MCNC: 2.2 MG/DL (ref 1.6–2.6)
MCH RBC QN AUTO: 31.5 PG (ref 25.2–33.5)
MCHC RBC AUTO-ENTMCNC: 34.3 G/DL (ref 28.4–34.8)
MCV RBC AUTO: 92 FL (ref 82.6–102.9)
MONOCYTES NFR BLD: 0.38 K/UL (ref 0.1–1.2)
MONOCYTES NFR BLD: 7 % (ref 3–12)
NEUTROPHILS NFR BLD: 62 % (ref 36–65)
NEUTS SEG NFR BLD: 3.54 K/UL (ref 1.5–8.1)
NRBC BLD-RTO: 0 PER 100 WBC
PLATELET # BLD AUTO: 254 K/UL (ref 138–453)
PMV BLD AUTO: 9.1 FL (ref 8.1–13.5)
POTASSIUM SERPL-SCNC: 3.9 MMOL/L (ref 3.7–5.3)
PROT SERPL-MCNC: 6.6 G/DL (ref 6.6–8.7)
RBC # BLD AUTO: 4.85 M/UL (ref 4.21–5.77)
SODIUM SERPL-SCNC: 139 MMOL/L (ref 136–145)
TROPONIN I SERPL HS-MCNC: 6 NG/L (ref 0–22)
TROPONIN I SERPL HS-MCNC: 6 NG/L (ref 0–22)
WBC OTHER # BLD: 5.7 K/UL (ref 3.5–11.3)

## 2025-07-07 PROCEDURE — 80053 COMPREHEN METABOLIC PANEL: CPT

## 2025-07-07 PROCEDURE — 93005 ELECTROCARDIOGRAM TRACING: CPT

## 2025-07-07 PROCEDURE — 6360000002 HC RX W HCPCS

## 2025-07-07 PROCEDURE — 84484 ASSAY OF TROPONIN QUANT: CPT

## 2025-07-07 PROCEDURE — 85025 COMPLETE CBC W/AUTO DIFF WBC: CPT

## 2025-07-07 PROCEDURE — 99285 EMERGENCY DEPT VISIT HI MDM: CPT

## 2025-07-07 PROCEDURE — 83735 ASSAY OF MAGNESIUM: CPT

## 2025-07-07 PROCEDURE — 96374 THER/PROPH/DIAG INJ IV PUSH: CPT

## 2025-07-07 PROCEDURE — 71046 X-RAY EXAM CHEST 2 VIEWS: CPT

## 2025-07-07 PROCEDURE — 2500000003 HC RX 250 WO HCPCS

## 2025-07-07 RX ORDER — FAMOTIDINE 20 MG/1
20 TABLET, FILM COATED ORAL 2 TIMES DAILY
Qty: 28 TABLET | Refills: 0 | Status: SHIPPED | OUTPATIENT
Start: 2025-07-07 | End: 2025-07-21

## 2025-07-07 RX ADMIN — SODIUM CHLORIDE, PRESERVATIVE FREE 20 MG: 5 INJECTION INTRAVENOUS at 15:10

## 2025-07-07 ASSESSMENT — HEART SCORE: ECG: NORMAL

## 2025-07-07 NOTE — ED PROVIDER NOTES
Note Started: 3:26 PM EDT         [facility]     Emergency Department     Faculty Attestation    I performed a history and physical examination of the patient and discussed management with the resident. I reviewed the resident’s note and agree with the documented findings and plan of care. Any areas of disagreement are noted on the chart. I was personally present for the key portions of any procedures. I have documented in the chart those procedures where I was not present during the key portions. I have reviewed the emergency nurses triage note. I agree with the chief complaint, past medical history, past surgical history, allergies, medications, social and family history as documented unless otherwise noted below.        For Physician Assistant/ Nurse Practitioner cases/documentation I have personally evaluated this patient and have completed at least one if not all key elements of the E/M (history, physical exam, and MDM). Additional findings are as noted.  I have personally seen and evaluated the patient.  I find the patient's history and physical exam are consistent with the NP/PA documentation.  I agree with the care provided, treatment rendered, disposition and follow-up plan.    45-year-old male presenting with chest pressure and palpitations intermittently at night.  States that he noted his heart rate was low at home in the 50s.  He is physically fit, works out regularly.  No syncope.  Does not currently have any chest symptoms.    Exam:  General : Laying on the bed, awake, alert, and in no acute distress  CV : normal rate and regular rhythm  Lungs : Breathing comfortably on room air with no tachypnea, hypoxia, or increased work of breathing    DDx: Palpitations.  Patient is not bradycardic here, but describes bradycardic episodes at home.  Given that he is young, athletic I am less concerned that this is a pathologic bradycardia.  No EKG changes to suggest cardiac abnormalities such

## 2025-07-07 NOTE — ED NOTES
Pt to ED via triage with c/o chest pressure and shortness of breath that started Saturday. Pt is a/ox4, ambulatory, RR even and non labored on room air, call light in reach.

## 2025-07-07 NOTE — DISCHARGE INSTRUCTIONS
You are seen in the emergency room for chest pains.  Your cardiac workup was negative at this time.  You are being discharged to please follow-up with your primary care physician and return to the emergency room if any of your symptoms worsen or develop any new symptoms or concern.  You are being discharged a few doses of Pepcid.  If you find that this is not helpful as it is a medication for acid reflux you may stop taking it.

## 2025-07-07 NOTE — ED PROVIDER NOTES
[facility] EMERGENCY DEPARTMENT  Emergency Department Encounter  Emergency Medicine Resident     Pt Name:Mendoza Egan  MRN: [id number]  Birthdate 1980  Date of evaluation: 7/7/25  PCP:  Paul Coleman MD  Note Started: 2:59 PM EDT      CHIEF COMPLAINT       No chief complaint on file.      HISTORY OF PRESENT ILLNESS  (Location/Symptom, Timing/Onset, Context/Setting, Quality, Duration, Modifying Factors, Severity.)      Mendoza Egan is a 45 y.o. male who presents with chest tightness and feeling sensation that he cannot get full breath started on Saturday.  Pressure-like pain that does not radiate across his chest.  Patient called his primary care who advised him to come to the emergency room for evaluation today.  Denies any fevers, chills, cough, congestion or abdominal pain.  Is currently on PrEP but no other medications.  [place]ed outside the country [place] a [place] ago.  No swelling of the lower extremities no hormonal therapy no recent immobilization or surgical intervention.  No history of DVTs or blood clots.  No swelling of his lower extremities.  Patient does not smoke or history of cocaine use.  Possible family history of coronary artery disease with family Thalia in their 40s and 50s on the dad side but no other significant past medical history.  Patient denies loss of consciousness.  Does state that when he burps he feels slightly better.    PAST MEDICAL / SURGICAL / SOCIAL / FAMILY HISTORY      has no past medical history on file.     has no past surgical history on file.    Social History     Socioeconomic History    Marital status: Single     Spouse name: Not on file    Number of children: Not on file    Years of education: Not on file    Highest education level: Not on file   Occupational History    Not on file   Tobacco Use    Smoking status: Never    Smokeless tobacco: Never   Vaping Use    Vaping status: Never Used   Substance and Sexual Activity    Alcohol use: Yes

## 2025-07-08 LAB
EKG ATRIAL RATE: 63 BPM
EKG P AXIS: 89 DEGREES
EKG P-R INTERVAL: 112 MS
EKG Q-T INTERVAL: 428 MS
EKG QRS DURATION: 84 MS
EKG QTC CALCULATION (BAZETT): 437 MS
EKG R AXIS: 23 DEGREES
EKG T AXIS: 24 DEGREES
EKG VENTRICULAR RATE: 63 BPM

## 2025-07-08 PROCEDURE — 93010 ELECTROCARDIOGRAM REPORT: CPT | Performed by: INTERNAL MEDICINE
